# Patient Record
Sex: MALE | Race: WHITE
[De-identification: names, ages, dates, MRNs, and addresses within clinical notes are randomized per-mention and may not be internally consistent; named-entity substitution may affect disease eponyms.]

---

## 2018-03-02 ENCOUNTER — HOSPITAL ENCOUNTER (OUTPATIENT)
Dept: HOSPITAL 75 - RAD | Age: 53
End: 2018-03-02
Attending: FAMILY MEDICINE
Payer: COMMERCIAL

## 2018-03-02 DIAGNOSIS — E83.52: Primary | ICD-10-CM

## 2018-03-02 PROCEDURE — 76536 US EXAM OF HEAD AND NECK: CPT

## 2018-03-02 NOTE — DIAGNOSTIC IMAGING REPORT
PROCEDURE: US Thyroid.



TECHNIQUE: Multiple real-time grayscale images were obtained of

the thyroid in various projections.



INDICATION: Hypercalcemia.



FINDINGS: The right lobe of the thyroid measures 5.6 x 2.0 x 2.3

cm and the left lobe measures 4.2 x 1.4 x 1.6 cm. Both lobes

demonstrate homogeneous echotexture. No discrete thyroid mass is

identified. No definite mass along the posterior aspect of the

thyroid is seen to suggest a parathyroid mass.



IMPRESSION: Unremarkable thyroid ultrasound. If there is

continued clinical concern for parathyroid adenoma, nuclear

medicine parathyroid scan would be recommended for further

evaluation.



Dictated by: 



  Dictated on workstation # [id number]

## 2018-03-09 ENCOUNTER — HOSPITAL ENCOUNTER (OUTPATIENT)
Dept: HOSPITAL 75 - CARD | Age: 53
End: 2018-03-09
Attending: FAMILY MEDICINE
Payer: COMMERCIAL

## 2018-03-09 DIAGNOSIS — R03.0: ICD-10-CM

## 2018-03-09 DIAGNOSIS — D35.1: ICD-10-CM

## 2018-03-09 DIAGNOSIS — E20.9: Primary | ICD-10-CM

## 2018-03-09 PROCEDURE — 78072 PARATHYRD PLANAR W/SPECT&CT: CPT
